# Patient Record
(demographics unavailable — no encounter records)

---

## 2024-12-19 NOTE — HISTORY OF PRESENT ILLNESS
[FreeTextEntry1] : NEHEMIAH PALOMARES is a 56 year old female with no significant PMH, presenting today for evaluation of upper GI complaints. She reports epigastric burning at least 2-3 times a week for several years. It usually lasts all day unless she takes an Omeprazole which will help symptoms subside, however she only does this occasionally. She has occasional nausea but no vomiting. There are no particular food triggers, and it sometimes occurs even if she has not eaten. She denies dysphagia, acid regurgitation, weight loss, loss of appetite. Her last EGD was in 2019 with Dr. Baires and revealed gastric erosions and erythema. Biopsies were unremarkable.   Colonoscopy in 2019 was normal. No family history of colon cancer or polyps. She has regular bowel movements without blood. Next colonoscopy due in 2029.

## 2024-12-19 NOTE — END OF VISIT
[FreeTextEntry3] : I, Dr. Hein, personally performed the evaluation and management (E/M) services for this new patient.  That E/M includes conducting the clinically appropriate initial history and/or exam, assessing all conditions, and establishing the plan of care.  Today my KOTA, Debi Wolfe, was here to observe my evaluation and management services for this patient and follow-up plan of care established by me going forward.

## 2024-12-19 NOTE — REASON FOR VISIT
[Initial Evaluation] : an initial evaluation [Family Member] : family member [FreeTextEntry1] : epigastric pain, nausea

## 2024-12-19 NOTE — ASSESSMENT
[FreeTextEntry1] : 56 year old female with epigastric pain and burning for several years. Prior EGD in 2019 revealed erosive gastritis. Has only been using Omeprazole 20 mg OTC PRN. Will schedule for upper endoscopy for further evaluation. I have discussed the indications (including but not limited to ruling out Sanchez's esophagus, AVM's, H. pylori, and malignancies), benefits, risks (including but not limited to reaction to the anesthesia, infection, bleeding, missed lesions, and perforation), and alternatives to an endoscopy. The patient understands all options and has agreed to endoscopy and is medically optimized for the planned procedure.   GERD diet Omeprazole 20 mg OTC PRN  Labs, including celiac studies, prior to procedure

## 2024-12-19 NOTE — ADDENDUM
[FreeTextEntry1] : I, Debi Wolfe NP, acted as scribe for Dr. Prasanth Hein for this patient encounter

## 2024-12-19 NOTE — PHYSICAL EXAM

## 2025-02-14 NOTE — DISCUSSION/SUMMARY
[FreeTextEntry1] : 57 y/o postmenopausal patient for WWE- -cervical cancer screening reviewed as per ASCCP guidelines - her recent pap smear results were reviewed with her, and recommendation for pap every 3 years - no documentation of her most recent pap smear from 2023, so one was performed today -breast cancer screening - clinical breast exam negative, self breast exam reviewed, mammogram was negative in september 2024 -osteoporosis - on fosamax, managed by her PMD  follow-up in one year or as needed

## 2025-02-14 NOTE — REASON FOR VISIT
[Language Line ] : provided by Language Line   [Interpreters_IDNumber] : [id number] [Interpreters_FullName] : Radha Severino [TWNoteComboBox1] : Icelandic

## 2025-02-14 NOTE — PHYSICAL EXAM
[Chaperone Present] : A chaperone was present in the examining room during all aspects of the physical examination [[id number]] : A chaperone was present during the pelvic exam. [Appropriately responsive] : appropriately responsive [Alert] : alert [No Acute Distress] : no acute distress [No Lymphadenopathy] : no lymphadenopathy [Regular Rate Rhythm] : regular rate rhythm [No Murmurs] : no murmurs [Clear to Auscultation B/L] : clear to auscultation bilaterally [Soft] : soft [Non-tender] : non-tender [Non-distended] : non-distended [Oriented x3] : oriented x3 [Examination Of The Breasts] : a normal appearance [No Discharge] : no discharge [No Masses] : no breast masses were palpable [Labia Majora] : normal [Labia Minora] : normal [Normal] : normal [Uterine Adnexae] : normal [FreeTextEntry9] : deferred [FreeTextEntry8] : no CMT, normal sized uterus, no palpable adnexal masses, NT

## 2025-02-14 NOTE — HISTORY OF PRESENT ILLNESS
[Mammogramdate] : 09/05/24 [TextBox_19] : BR1 [PapSmeardate] : 2023 [BoneDensityDate] : 05/10/23 [TextBox_37] : OSTEOPOROSIS [ColonoscopyDate] : 2019 [LMPDate] : 2012 [PGHxTotal] : 3 [[facility]] : [facility] [[facility]] : 3 [FreeTextEntry1] : 2012

## 2025-02-14 NOTE — REASON FOR VISIT
[Language Line ] : provided by Language Line   [Interpreters_IDNumber] : [id number] [Interpreters_FullName] : Radha Severino [TWNoteComboBox1] : [place] African

## 2025-02-14 NOTE — HISTORY OF PRESENT ILLNESS
[Mammogramdate] : 09/05/24 [TextBox_19] : BR1 [PapSmeardate] : 2023 [BoneDensityDate] : 05/10/23 [TextBox_37] : OSTEOPOROSIS [ColonoscopyDate] : 2019 [LMPDate] : 2012 [PGHxTotal] : 3 [[facility]] : 3 [[facility] Servicesiving] : 3 [FreeTextEntry1] : 2012

## 2025-02-14 NOTE — PHYSICAL EXAM
[Chaperone Present] : A chaperone was present in the examining room during all aspects of the physical examination [[id number]] : A chaperone was present during the pelvic exam. [Appropriately responsive] : appropriately responsive [Alert] : alert [No Acute Distress] : no acute distress [No Lymphadenopathy] : no lymphadenopathy [Regular Rate Rhythm] : regular rate rhythm [No Murmurs] : no murmurs [Clear to Auscultation B/L] : clear to auscultation bilaterally [Soft] : soft [Non-tender] : non-tender [Non-distended] : non-distended [Oriented x3] : oriented x3 [Examination Of The Breasts] : a normal appearance [No Discharge] : no discharge [No Masses] : no breast masses were palpable [Labia Majora] : normal [Labia Minora] : normal [Normal] : normal [Uterine Adnexae] : normal [FreeTextEntry9] : deferred [FreeTextEntry8] : no CMT, normal sized uterus, no palpable adnexal masses, NT

## 2025-03-04 NOTE — PHYSICAL EXAM
[Alert] : alert [Normal Voice/Communication] : normal voice/communication [Healthy Appearing] : healthy appearing [No Acute Distress] : no acute distress [Sclera] : the sclera and conjunctiva were normal [Hearing Threshold Finger Rub Not [place]] : hearing was normal [Normal Lips/Gums] : the lips and gums were normal [Oropharynx] : the oropharynx was normal [Normal Appearance] : the appearance of the neck was normal [No Respiratory Distress] : no respiratory distress [No Acc Muscle Use] : no accessory muscle use [Respiration, Rhythm And Depth] : normal respiratory rhythm and effort [Auscultation Breath Sounds / Voice Sounds] : lungs were clear to auscultation bilaterally [Heart Rate And Rhythm] : heart rate was normal and rhythm regular [Normal S1, S2] : normal S1 and S2 [Murmurs] : no murmurs [Bowel Sounds] : normal bowel sounds [Abdomen Tenderness] : non-tender [No Masses] : no abdominal mass palpated [Abdomen Soft] : soft [] : no hepatosplenomegaly [Oriented To Time, Place, And Person] : oriented to person, place, and time

## 2025-07-01 NOTE — PLAN
[TextEntry] : #1 Laryngopharyngeal Reflux  - Discussed Lifestyle changes as the most effective methods to improvement LPR. Weight Loss if overweight. Avoid foods that increase the level of acid in your stomach, including caffeinated/carbonated drinks.   - Avoid foods that decrease the pressure in the lower esophagus, such as fatty foods, alcohol and peppermint.  - Avoid large meals. Try to have an empty stomach 2 hours before going to bed.  - Head of bed elevation when you are lying down  - LPR Handout Given  - Discussed Sodium alginate as mechanical plug for preventing reflux  - Discussed with Patient if they have not seen Gastroenterology in the past for endoscopy they should follow up as chronic reflux can causes mucosal changes in the lining of the lower esophagus; pt states she had an endoscopy done in January and has a follow up appt soon to discuss the results - Start Omeprazole 40 mg daily, instructed pt to take daily in the morning - at least 30 minutes before breakfast on an empty stomach - Return to office in 1 month for follow up or sooner if symptoms persist/worsen  #2 Lymph Node Enlargement  - Will obtain US head and neck at this time, and discuss results when available  #3 Itchy ears - Advised patient to start Dermotic ear drops - 5 drops in each ear BID, for 1 week

## 2025-07-01 NOTE — HISTORY OF PRESENT ILLNESS
[de-identified] : 57-year-old female here accompanied by daughter, for throat pain, describes it as a dry and burning sensation in her throat. She states that she has had these symptoms for several months but feels it has worsened in the last 3 weeks or so. Pt also used to follow with ENT and allergy and has a history of right neck lymph nodes inflammation - she states she would get US every year or so and have it monitored. However, since they do not take her insurance any longer, she lost follow up and would like to check it again. Denies neck pain at this time. She also adds that her previous ENT prescribed her drops occasionally for itchy ears and was told she has eczema. She states that her ears have been itchy intermittently and was asking for drops. Patient denies dysphagia, dyspnea, dysphonia or otalgia. Denies use of over-the-counter antacids or reflux medications. Denies history of smoking or alcohol use.

## 2025-07-01 NOTE — PROCEDURE
[Flexible Endoscope] : examined with the flexible endoscope [Serial Number: ___] : Serial Number: [unfilled] [Normal] : the false vocal folds were pink and regular, the ventricular sulcus was open, the true vocal folds were glistening white, tense and of equal length, mobility, and height [True Vocal Cords Paralysis] : no true vocal cord paralysis [True Vocal Cords Erythematous] : no true vocal cord edema [True Vocal Cords Devlin's Nodules] : no true vocal cord nodules [Glottis Arytenoid Cartilages] : no arytenoid granulomas [Glottis Arytenoid Cartilages Erythema] : bilateral arytenoid ~M erythema [Arytenoid Edema ___ /4] : arytenoid edema [unfilled]U/4 [Arytenoid Erythema ___ /4] : arytenoid erythema [unfilled]U/4 [Interarytenoid Edema] : interarytenoid area edematous

## 2025-07-01 NOTE — CONSULT LETTER
[Dear  ___] : Dear  [unfilled], [Courtesy Letter:] : I had the pleasure of seeing your patient, [unfilled], in my office today. [Please see my note below.] : Please see my note below. [Sincerely,] : Sincerely, [FreeTextEntry2] : Dr. Gonzalez [FreeTextEntry3] : Brenda Phan DO Otolaryngology at [address] [place] [id number] Phone: [phone] Fax: [phone]